# Patient Record
(demographics unavailable — no encounter records)

---

## 2024-10-22 NOTE — HISTORY OF PRESENT ILLNESS
[FreeTextEntry1] : 38 y/o  presents for annual GYN exam.  Sexually active with no issues. Uses condoms for family planning.  is considering vasectomy.  Regular monthly periods.  Still complains of some urinary incontinence and urgency.  Followed by Dr. Swann for high-risk breast cancer family history.  MGM and MGF colon cancer.  Paternal aunt and cousin breast cancer.  No ovarian or uterine ca  Lives with  and two daughters, Onia age 5 and Aleta age 3.   [Mammogramdate] : 6/21/2024 [TextBox_19] : BI-RADS 2 D [BreastSonogramDate] : 6/21/2024 [TextBox_25] : BI-RADS 2D [PapSmeardate] : 10/15/2022 [TextBox_31] : NILM/HPV- [ColonoscopyDate] : 12/06/21 [TextBox_43] :  Polyp, 5-year recall

## 2024-10-22 NOTE — PHYSICAL EXAM
[Chaperone Declined] : Patient declined chaperone [Appropriately responsive] : appropriately responsive [Alert] : alert [No Acute Distress] : no acute distress [Soft] : soft [Non-tender] : non-tender [Non-distended] : non-distended [No HSM] : No HSM [No Mass] : no mass [Oriented x3] : oriented x3 [Examination Of The Breasts] : a normal appearance [No Discharge] : no discharge [No Masses] : no breast masses were palpable [No Lesions] : no lesions  [Labia Majora] : normal [Labia Minora] : normal [Pink Rugae] : pink rugae [Normal] : normal [Uterine Adnexae] : normal [Tenderness] : nontender [Enlarged ___ wks] : not enlarged [FreeTextEntry4] : weak pelvic floor muscles,cystocele

## 2025-06-23 NOTE — HISTORY OF PRESENT ILLNESS
[FreeTextEntry1] : Paternal aunt and cousin with premenopausal breast cancer Mother just diagnosed with breast cancer 12/19 [facility] tested negative Artemio Ov[place]o shows a 31.9% risk of breast cancer Margarette model shows a 1.2% 5-year and 20.9% lifetime risk of breast cancer.  Patient denies any breast masses or nipple discharge.  Recent mammogram and ultrasound were unremarkable BI-RADS 2.  Patient is still interested in getting a breast reduction.  Patient is currently breast-feeding and denies any breast masses or nipple discharge.  She has felt in the past a right breast cyst.  Patient's paternal cousin was diagnosed with breast cancer at 37 years of age and that cousin's mother her paternal aunt had breast cancer in her 40s and gene tested negative.  Patient's never had a breast biopsy.

## 2025-06-23 NOTE — PHYSICAL EXAM
[No Supraclavicular Adenopathy] : no supraclavicular adenopathy [No Cervical Adenopathy] : no cervical adenopathy [Examined in the supine and seated position] : examined in the supine and seated position [Symmetrical] : symmetrical [No dominant masses] : no dominant masses in right breast  [No dominant masses] : no dominant masses left breast [No Nipple Retraction] : no left nipple retraction [No Nipple Discharge] : no left nipple discharge [No Axillary Lymphadenopathy] : no left axillary lymphadenopathy [No Rashes] : no rashes [de-identified] : Tenderness in the right sternum.